# Patient Record
Sex: FEMALE | Race: ASIAN | NOT HISPANIC OR LATINO | URBAN - METROPOLITAN AREA
[De-identification: names, ages, dates, MRNs, and addresses within clinical notes are randomized per-mention and may not be internally consistent; named-entity substitution may affect disease eponyms.]

---

## 2017-01-27 ENCOUNTER — COMMUNICATION - HEALTHEAST (OUTPATIENT)
Dept: FAMILY MEDICINE | Facility: CLINIC | Age: 82
End: 2017-01-27

## 2017-01-27 DIAGNOSIS — R52 PAIN: ICD-10-CM

## 2017-01-30 ENCOUNTER — COMMUNICATION - HEALTHEAST (OUTPATIENT)
Dept: FAMILY MEDICINE | Facility: CLINIC | Age: 82
End: 2017-01-30

## 2017-01-30 DIAGNOSIS — R52 PAIN: ICD-10-CM

## 2017-03-20 ENCOUNTER — OFFICE VISIT - HEALTHEAST (OUTPATIENT)
Dept: FAMILY MEDICINE | Facility: CLINIC | Age: 82
End: 2017-03-20

## 2017-03-20 DIAGNOSIS — J11.1 INFLUENZA: ICD-10-CM

## 2017-03-20 DIAGNOSIS — S36.119A LIVER INJURY, INITIAL ENCOUNTER: ICD-10-CM

## 2017-03-20 DIAGNOSIS — I48.91 ATRIAL FIBRILLATION WITH RAPID VENTRICULAR RESPONSE (H): ICD-10-CM

## 2017-03-20 DIAGNOSIS — F03.90 DEMENTIA WITHOUT BEHAVIORAL DISTURBANCE, UNSPECIFIED DEMENTIA TYPE: ICD-10-CM

## 2017-04-10 ENCOUNTER — COMMUNICATION - HEALTHEAST (OUTPATIENT)
Dept: FAMILY MEDICINE | Facility: CLINIC | Age: 82
End: 2017-04-10

## 2017-04-10 DIAGNOSIS — E61.2 MAGNESIUM DEFICIENCY: ICD-10-CM

## 2017-05-10 ENCOUNTER — COMMUNICATION - HEALTHEAST (OUTPATIENT)
Dept: FAMILY MEDICINE | Facility: CLINIC | Age: 82
End: 2017-05-10

## 2017-05-10 DIAGNOSIS — K21.9 GASTROESOPHAGEAL REFLUX DISEASE WITHOUT ESOPHAGITIS: ICD-10-CM

## 2017-05-13 ENCOUNTER — COMMUNICATION - HEALTHEAST (OUTPATIENT)
Dept: FAMILY MEDICINE | Facility: CLINIC | Age: 82
End: 2017-05-13

## 2017-05-13 DIAGNOSIS — K21.9 GASTROESOPHAGEAL REFLUX DISEASE WITHOUT ESOPHAGITIS: ICD-10-CM

## 2017-05-23 ENCOUNTER — COMMUNICATION - HEALTHEAST (OUTPATIENT)
Dept: FAMILY MEDICINE | Facility: CLINIC | Age: 82
End: 2017-05-23

## 2017-05-23 DIAGNOSIS — F06.30 MOOD DISORDER DUE TO A GENERAL MEDICAL CONDITION: ICD-10-CM

## 2017-08-24 ENCOUNTER — COMMUNICATION - HEALTHEAST (OUTPATIENT)
Dept: FAMILY MEDICINE | Facility: CLINIC | Age: 82
End: 2017-08-24

## 2017-08-24 DIAGNOSIS — F06.30 MOOD DISORDER DUE TO A GENERAL MEDICAL CONDITION: ICD-10-CM

## 2017-08-30 ENCOUNTER — COMMUNICATION - HEALTHEAST (OUTPATIENT)
Dept: FAMILY MEDICINE | Facility: CLINIC | Age: 82
End: 2017-08-30

## 2017-08-30 DIAGNOSIS — R63.0 ANOREXIA: ICD-10-CM

## 2017-09-11 ENCOUNTER — COMMUNICATION - HEALTHEAST (OUTPATIENT)
Dept: FAMILY MEDICINE | Facility: CLINIC | Age: 82
End: 2017-09-11

## 2017-09-14 ENCOUNTER — OFFICE VISIT - HEALTHEAST (OUTPATIENT)
Dept: FAMILY MEDICINE | Facility: CLINIC | Age: 82
End: 2017-09-14

## 2017-09-14 DIAGNOSIS — J18.9 PNEUMONIA: ICD-10-CM

## 2017-09-14 DIAGNOSIS — I48.91 ATRIAL FIBRILLATION WITH RAPID VENTRICULAR RESPONSE (H): ICD-10-CM

## 2017-09-14 DIAGNOSIS — M25.511 ACUTE PAIN OF RIGHT SHOULDER: ICD-10-CM

## 2017-10-04 ENCOUNTER — COMMUNICATION - HEALTHEAST (OUTPATIENT)
Dept: FAMILY MEDICINE | Facility: CLINIC | Age: 82
End: 2017-10-04

## 2017-10-04 DIAGNOSIS — E61.2 MAGNESIUM DEFICIENCY: ICD-10-CM

## 2017-11-03 ENCOUNTER — COMMUNICATION - HEALTHEAST (OUTPATIENT)
Dept: FAMILY MEDICINE | Facility: CLINIC | Age: 82
End: 2017-11-03

## 2017-11-03 DIAGNOSIS — K21.9 GASTROESOPHAGEAL REFLUX DISEASE WITHOUT ESOPHAGITIS: ICD-10-CM

## 2017-11-20 ENCOUNTER — COMMUNICATION - HEALTHEAST (OUTPATIENT)
Dept: FAMILY MEDICINE | Facility: CLINIC | Age: 82
End: 2017-11-20

## 2017-11-20 DIAGNOSIS — F06.30 MOOD DISORDER DUE TO A GENERAL MEDICAL CONDITION: ICD-10-CM

## 2018-02-13 ENCOUNTER — COMMUNICATION - HEALTHEAST (OUTPATIENT)
Dept: FAMILY MEDICINE | Facility: CLINIC | Age: 83
End: 2018-02-13

## 2018-02-13 DIAGNOSIS — F06.30 MOOD DISORDER DUE TO A GENERAL MEDICAL CONDITION: ICD-10-CM

## 2018-05-03 ENCOUNTER — COMMUNICATION - HEALTHEAST (OUTPATIENT)
Dept: FAMILY MEDICINE | Facility: CLINIC | Age: 83
End: 2018-05-03

## 2018-05-03 DIAGNOSIS — K21.9 GASTROESOPHAGEAL REFLUX DISEASE WITHOUT ESOPHAGITIS: ICD-10-CM

## 2018-07-06 ENCOUNTER — COMMUNICATION - HEALTHEAST (OUTPATIENT)
Dept: FAMILY MEDICINE | Facility: CLINIC | Age: 83
End: 2018-07-06

## 2018-08-29 ENCOUNTER — COMMUNICATION - HEALTHEAST (OUTPATIENT)
Dept: FAMILY MEDICINE | Facility: CLINIC | Age: 83
End: 2018-08-29

## 2018-08-29 DIAGNOSIS — F06.30 MOOD DISORDER DUE TO A GENERAL MEDICAL CONDITION: ICD-10-CM

## 2018-09-26 ENCOUNTER — COMMUNICATION - HEALTHEAST (OUTPATIENT)
Dept: FAMILY MEDICINE | Facility: CLINIC | Age: 83
End: 2018-09-26

## 2018-09-26 DIAGNOSIS — F06.30 MOOD DISORDER DUE TO A GENERAL MEDICAL CONDITION: ICD-10-CM

## 2018-09-26 DIAGNOSIS — E61.2 MAGNESIUM DEFICIENCY: ICD-10-CM

## 2018-12-03 ENCOUNTER — COMMUNICATION - HEALTHEAST (OUTPATIENT)
Dept: FAMILY MEDICINE | Facility: CLINIC | Age: 83
End: 2018-12-03

## 2018-12-03 DIAGNOSIS — E61.2 MAGNESIUM DEFICIENCY: ICD-10-CM

## 2018-12-05 ENCOUNTER — COMMUNICATION - HEALTHEAST (OUTPATIENT)
Dept: FAMILY MEDICINE | Facility: CLINIC | Age: 83
End: 2018-12-05

## 2018-12-05 DIAGNOSIS — F06.30 MOOD DISORDER DUE TO A GENERAL MEDICAL CONDITION: ICD-10-CM

## 2019-02-06 ENCOUNTER — OFFICE VISIT - HEALTHEAST (OUTPATIENT)
Dept: FAMILY MEDICINE | Facility: CLINIC | Age: 84
End: 2019-02-06

## 2019-02-06 DIAGNOSIS — N39.41 URGENCY INCONTINENCE: ICD-10-CM

## 2019-02-06 DIAGNOSIS — F06.30 MOOD DISORDER DUE TO A GENERAL MEDICAL CONDITION: ICD-10-CM

## 2019-02-06 DIAGNOSIS — M54.9 UPPER BACK PAIN: ICD-10-CM

## 2019-02-06 DIAGNOSIS — R52 PAIN: ICD-10-CM

## 2019-07-25 ENCOUNTER — RECORDS - HEALTHEAST (OUTPATIENT)
Dept: ADMINISTRATIVE | Facility: OTHER | Age: 84
End: 2019-07-25

## 2019-10-01 ENCOUNTER — COMMUNICATION - HEALTHEAST (OUTPATIENT)
Dept: SCHEDULING | Facility: CLINIC | Age: 84
End: 2019-10-01

## 2021-01-01 ENCOUNTER — COMMUNICATION - HEALTHEAST (OUTPATIENT)
Dept: INTERNAL MEDICINE | Facility: CLINIC | Age: 86
End: 2021-01-01

## 2021-01-01 ENCOUNTER — RECORDS - HEALTHEAST (OUTPATIENT)
Dept: ADMINISTRATIVE | Facility: OTHER | Age: 86
End: 2021-01-01

## 2021-01-01 ENCOUNTER — COMMUNICATION - HEALTHEAST (OUTPATIENT)
Dept: SCHEDULING | Facility: CLINIC | Age: 86
End: 2021-01-01

## 2021-01-01 ENCOUNTER — COMMUNICATION - HEALTHEAST (OUTPATIENT)
Dept: NURSING | Facility: CLINIC | Age: 86
End: 2021-01-01

## 2021-01-01 ENCOUNTER — AMBULATORY - HEALTHEAST (OUTPATIENT)
Dept: CARE COORDINATION | Facility: CLINIC | Age: 86
End: 2021-01-01

## 2021-01-01 ENCOUNTER — COMMUNICATION - HEALTHEAST (OUTPATIENT)
Dept: FAMILY MEDICINE | Facility: CLINIC | Age: 86
End: 2021-01-01

## 2021-01-01 ENCOUNTER — COMMUNICATION - HEALTHEAST (OUTPATIENT)
Dept: CARE COORDINATION | Facility: CLINIC | Age: 86
End: 2021-01-01

## 2021-01-01 ENCOUNTER — AMBULATORY - HEALTHEAST (OUTPATIENT)
Dept: FAMILY MEDICINE | Facility: CLINIC | Age: 86
End: 2021-01-01

## 2021-01-01 ENCOUNTER — TELEPHONE (OUTPATIENT)
Dept: FAMILY MEDICINE | Facility: CLINIC | Age: 86
End: 2021-01-01

## 2021-01-01 ENCOUNTER — OFFICE VISIT - HEALTHEAST (OUTPATIENT)
Dept: FAMILY MEDICINE | Facility: CLINIC | Age: 86
End: 2021-01-01

## 2021-01-01 ENCOUNTER — MEDICAL CORRESPONDENCE (OUTPATIENT)
Dept: HEALTH INFORMATION MANAGEMENT | Facility: CLINIC | Age: 86
End: 2021-01-01

## 2021-01-01 DIAGNOSIS — K59.01 CONSTIPATION BY DELAYED COLONIC TRANSIT: ICD-10-CM

## 2021-01-01 DIAGNOSIS — G89.4 PAIN SYNDROME, CHRONIC: ICD-10-CM

## 2021-01-01 DIAGNOSIS — R63.0 ANOREXIA: ICD-10-CM

## 2021-01-01 DIAGNOSIS — I42.9 CARDIOMYOPATHY, UNSPECIFIED TYPE (H): ICD-10-CM

## 2021-01-01 DIAGNOSIS — Z09 HOSPITAL DISCHARGE FOLLOW-UP: ICD-10-CM

## 2021-01-01 DIAGNOSIS — Z78.9 HEALTH CARE HOME, ACTIVE CARE COORDINATION: ICD-10-CM

## 2021-01-01 DIAGNOSIS — F03.90 DEMENTIA WITHOUT BEHAVIORAL DISTURBANCE, UNSPECIFIED DEMENTIA TYPE: ICD-10-CM

## 2021-01-01 RX ORDER — BISACODYL 10 MG
SUPPOSITORY, RECTAL RECTAL
Qty: 12 SUPPOSITORY | Refills: 0 | Status: SHIPPED | OUTPATIENT
Start: 2021-01-01

## 2021-01-01 ASSESSMENT — PATIENT HEALTH QUESTIONNAIRE - PHQ9
SUM OF ALL RESPONSES TO PHQ QUESTIONS 1-9: 0
SUM OF ALL RESPONSES TO PHQ QUESTIONS 1-9: 0

## 2021-06-01 NOTE — TELEPHONE ENCOUNTER
Who is calling:  MISHA Samuels   Reason for Call: Insurance Rep calling to check apt status for patient as they have requested DME supplies. No apt in the past 30 days or future scheduled.   Date of last appointment with primary care: 02/06/19  Okay to leave a detailed message: Yes

## 2021-06-09 NOTE — PROGRESS NOTES
Initially stomach pain.  Wet all over.  Like water.  Then body ache all over.  Had fever.  Six days ago.  Could not get in and pt refused to go to ED.  Today is better.   Body ache and pain like bone cracking.  Pt agrees is better by...         Stomach is much better.  But still body ache about the same.  Moans at night.   Is constipated.  Urine is normal.    Atrial fibrillation denies palpitations. Denies any bleeding    Anticoagulated denies bleed    Hx liver injury denies icterus  ROS: as noted above    OBJECTIVE:   Vitals:    03/20/17 1611   BP: 120/76   Pulse: 68   Resp: 20   Temp: 97.9  F (36.6  C)   SpO2: 96%      Eyes: non icteric, noninflamed  Lungs: no resp distress  Heart: irregular  Ankles: no edema  Muscles: nontender  Mental status: tired, verbal, no distress  Skin warm and dry  Neuro: nonfocal    ASSESSMENT/PLAN:  Likely resolving influenza.  Too late for antiviral/ sx tx.   Fluids.  Anticipate return to baseline and follow up for chronic issues otherwise follow up   1. Influenza  acetaminophen-codeine (TYLENOL #2) 300-15 mg per tablet   2. Atrial fibrillation with rapid ventricular response     3. Liver injury, initial encounter     4. Dementia without behavioral disturbance, unspecified dementia type

## 2021-06-13 NOTE — PROGRESS NOTES
ED shoulder pain.  rx analgesic and better today.  Last narcotic yesterday.    cxr finding of ? Left lower pneumonia.  Sent home on antibiotic.  Had cough at the time.  Now without cough or fever or sob.    Atrial fibrillation denies palpitations. Denies any bleeding    On anticoagulant not needing inr.  On bblocker for hx rapid vent response    States walks with cane or walker about fifteen yards.  Needs help with bathing.  Can go slowly up stairs with rest and some weight bearing assist.    Missing teeth and having difficulty chewing   ROS: as noted above    OBJECTIVE:   Vitals:    09/14/17 1054   BP: 106/66   Pulse: 74   Resp: 24   Temp: 98.1  F (36.7  C)   SpO2: 96%      Eyes: non icteric, noninflamed  Lungs: no resp distress  Heart: regular  Ankles: no edema  Muscles: nontender  Mental status: euthymic.   Calm  Neuro: nonfocal  Wheelchair  Symmetric use of arms and hands.  No drift  Mild periscapular tenderness  Wt is stable from a year ago    ASSESSMENT/PLAN:  Improving sx though ? Diagnosis.  Could be infectious with related myalgia.  In any case is better/ finish antibiotic, eat, mobilize and follow up in two wks for film and general labs requested by family    1. Pneumonia     2. Acute pain of right shoulder     3. Atrial fibrillation with rapid ventricular response       Chronic issues stable/ same treatment.

## 2021-06-15 NOTE — TELEPHONE ENCOUNTER
New Appointment Needed  What is the reason for the visit:    Inpatient/ED Follow Up Appt Request  At what hospital or facility were you seen?: [facility]  What is the reason you were seen?: [place]  What date were you admitted?: date: 03-02-21  What date were you discharged?: date: 03-04-21  What was the recommended timeframe for your follow up appointment?: As soon as possible  Provider Preference: Any available  How soon do you need to be seen?: next week  Waitlist offered?: No  Okay to leave a detailed message:  Yes, confidential and secured phone.  [phone]

## 2021-06-15 NOTE — TELEPHONE ENCOUNTER
It apparently went to our internal hospice - I tried to re-write it today.  (I didn't realize it had to go to [facility]).

## 2021-06-15 NOTE — TELEPHONE ENCOUNTER
Valentine from [facility] calling regarding referral request made 03/08.    Reviewed chart and relayed that PCP sent referral in on the evening of 3/8.     Inquiring where the referral has been sent, it has not been received at [facility].    Requesting referral is faxed to  Attn.: Care Navigation    Needs today if at all feasible.

## 2021-06-15 NOTE — PROGRESS NOTES
3/8/2021  Patient enrolled in [facility] as of 3-8-21  Reviewed assessment, goals, and any delegations from [facility] RN         CHW Follow up: Monthly    CHW Plan: Follow up on goals    CHW Next Follow Up: 4-7-21

## 2021-06-15 NOTE — PROGRESS NOTES
Ria Farrar is a 89 y.o. female who is being evaluated via a billable video visit.      How would you like to obtain your AVS? Mail a copy.  If dropped from the video visit, the video invitation should be resent by: Text to cell phone: [phone]  Will anyone else be joining your video visit? Yes: daughter in law. How would they like to receive their invitation? Text to cell phone: [phone]      Video Start Time: 10:23 AM    ASSESSMENT/PLAN:  1. Pain syndrome, chronic  DISCONTINUED: HYDROmorphone (DILAUDID) 4 mg/4 mL Liqd solution   2. Constipation by delayed colonic transit  DISCONTINUED: bisacodyL (DULCOLAX) 10 mg suppository   3. Hospital discharge follow-up         This is an 89 yo female - recently hospitalized at [facility] for overall weakness, rapid health decline.    I have reviewed available hospital records, consults, notes, discharge summary as well as imaging and lab results.  In addition I have reviewed her medication list and made any adjustments as indicated in orders.      Patient has been discharged home and felt to be terminal .  Discussions regarding hospice were held in hospital - family are there 24 hours/day right now - she is occasionally awake and responsive, but taking in very little.  Was not discharged on any medications.  Family will be meeting with demi this weekend to discuss situation and review willingness to pursue hospice therapy.  For now, there is home care coming in - a nurse was available to me during this visit to navigate needs.  She suggests that patient is moaning and seems uncomfortable.  Because she is taking in very little, we will try concentrated pain medication:  Hydromorphone.  Also , there is concern for constipation - multifactorial - will add Bisacodyl suppositories prn.    I have answered questions from family, home care nurse.  Counseled family regarding expectations.  They will let us know about hospice as soon as possible.   Return in about 2 weeks  (around 3/19/2021) for Recheck.      Medications Discontinued During This Encounter   Medication Reason     bisacodyl (DULCOLAX) 10 mg suppository Reorder     traMADol (ULTRAM) 50 mg tablet Therapy completed     rivaroxaban 15 mg Tab Therapy completed     ondansetron (ZOFRAN) 8 MG tablet Therapy completed     omeprazole (PRILOSEC) 20 MG capsule Therapy completed     NUTRITIONAL DRINK Liqd Therapy completed     metoprolol succinate (TOPROL-XL) 25 MG Therapy completed     magnesium oxide (MAG-OX) 400 mg (241.3 mg magnesium) tablet Therapy completed     magnesium hydroxide (MILK OF MAG) 400 mg/5 mL Susp suspension Therapy completed     dextromethorphan-guaiFENesin (ROBITUSSIN-DM)  mg/5 mL liquid      citalopram (CELEXA) 10 MG tablet Therapy completed     acetaminophen 500 mg coapsule Therapy completed     There are no Patient Instructions on file for this visit.    Chief Complaint:  Chief Complaint   Patient presents with     Hospital Visit Follow Up     no urine output, not opening eyes or real responses, hospice converstaion       HPI:   Ria Farrar is a 89 y.o. 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  Social History     Socioeconomic History     Marital status:      Spouse name: Not on file     Number of children: Not on file     Years of education: Not on file     Highest education level: Not on file   Occupational History     Not on file   Social Needs     Financial resource strain: Not on file     Food insecurity     Worry: Not on file     Inability: Not on file     Transportation needs     Medical: Not on file     Non-medical: Not on file   Tobacco Use     Smoking status: Never Smoker     Smokeless tobacco: Never Used   Substance and Sexual Activity     Alcohol use: No     Drug use: No     Sexual activity: Never   Lifestyle     Physical activity     Days per week: Not on file     Minutes per session: Not on file     Stress: Not on file   Relationships     Social connections     Talks on phone: Not on file     Gets together: Not on file     Attends Baptism service: Not on file     Active member of club or organization: Not on file     Attends meetings of clubs or organizations: Not on file     Relationship status: Not on file     Intimate partner violence     Fear of current or ex partner: Not on file     Emotionally abused: Not on file     Physically abused: Not on file     Forced sexual activity: Not on file   Other Topics Concern     Not on file   Social History Narrative     Not on file     No family history on file.    Meds:    Current Outpatient Medications:      bisacodyL (DULCOLAX) 10 mg suppository, Insert suppository rectally q 3 days as needed for treatment of constipation., Disp: 12 suppository, Rfl: 0     HYDROmorphone (DILAUDID) 4 mg/4 mL Liqd solution, Take 0.3 mL (0.3 mg total) by mouth every 12 (twelve) hours as needed (pain)., Disp: 10 mL, Rfl: 0     miscellaneous medical supply Misc, Hospital bed with mattress and 1/2 rails. Semi-electric bed. Length of need 99 months. Bed extender: no,  Disp: , Rfl:     Allergies:  No Known Allergies    ROS:  Pertinent positives as noted in HPI; otherwise 12 point ROS negative.      Physical Exam:  EXAM:  There were no vitals taken for this visit.   Gen:  NAD, lying in bed, appears comfortable currently, caregiver by her side  Extr:  Neg.  Neuro:  No asymmetry          Results:  Reviewed hospital results          Video-Visit Details    Type of service:  Video Visit    Video End Time (time video stopped): 10:47 AM  Originating Location (pt. Location): Home    Distant Location (provider location):  [facility]     Platform used for Video Visit: [employer]

## 2021-06-15 NOTE — PROGRESS NOTES
3/4/2021  CCC referral from [facility] PCP, services and support        Clinic Care Coordination Contact  Community Health Worker Initial Outreach    CHW Initial Information Gathering:  Referral Source: Other, specify(Care Coordinator at [facility])  Preferred Hospital: [facility]  [phone]  Preferred Urgent Care: [facility], [phone]  Current living arrangement:: I live in a private home with family  Type of residence:: Private home - stairs  Community Resources: PCA(someone will call to set up home care, PCA. daughter in law)  Supplies Currently Used at Home: (family don't know how to call to get more brief)  Equipment Currently Used at Home: walker, rolling, cane, straight, hospital bed, shower chair  Informal Support system:: Family, Children(daughter in law and son and PCA)  No PCP office visit in Past Year: Yes(has appt tomorrow 3-5-21 to establish care with Dr Gould)  Transportation means:: Family, Other(PCA daughter in law)  CHW Additional Questions  If ED/Hospital discharge, follow-up appointment scheduled as recommended?: Yes  Patient agreeable to assistance with scheduling?: Yes  CHW assisted patient to schedule (specify): appt with Dr River tomorrow 3-5-21  MyChart active?: No  Patient agreeable to assistance with activating MyChart?: No    Patient accepts CC: Yes. Patient scheduled for assessment with CCC RN  on 3-8-21  at 10:30am. Patient noted desire to discuss medical equipement wheelchair, briefs, services and support in home..     [facility]  : Dejan Farrar  Spoke to daughter in law Mary.  Consent on file to communicate.  Daughter in law reported.  has PCA services. PCA is daughter in law  Will be getting hospital bed soon  Some concerns that she still weak and might not able to make It to the appt tomorrow and wondering if they can change the appt to video appt    She is requesting help to get wheel  chair and briefs.  Suggested to talk to the doctor tomorrow for the order.    Coordinated with  and got appt switched to Video appt for tomorrow at 9:40am.    CHW Follow up: 3-8-21 review assessment, goals and consult with RN if needed.

## 2021-06-15 NOTE — TELEPHONE ENCOUNTER
3/5/2021  Received call from patient's daughter in law.  Stated she went to [facility] to  her medications but they said they don't have the medications and to go to [facility].    Daughter in law  requesting help to transfer or send all prescriptions to [facility] on [address]   She is not sure what all her medications are and patient had phone visit with Dr Gould today.    Please support daughter in law

## 2021-06-15 NOTE — TELEPHONE ENCOUNTER
Reason for Call: Request for an order or referral:    Order or referral being requested: a informational hospice visit referral    Date needed: as soon as possible    Has the patient been seen by the PCP for this problem? YES    Additional comments: no  Phone number Patient can be reached at:  Other phone number:  [phone]    Best Time:  As soon as possible    Can we leave a detailed message on this number?  Yes    Call taken on 3/8/2021 at 1:31 PM by Birgit Vides

## 2021-06-15 NOTE — PROGRESS NOTES
"3/3/2021  Received email from:   Bernice Chinchilla RN   Care Coordinator  [facility]   Phone:[phone]  Fax: [phone]    \"Donovan Belcher,  We do care coordination for the patient Ria Farrar 1/1/1932 and wanted to see if she is enrolled in the CC program at the clinic?  Ria is currently in the hospital at [facility] for weakness and not eating (failure to thrive?) and we are working with the  on her dc plan.  Dr. Cuadra was Ria s PCP and since he retired, she has not established a new PCP. She will be needing home health care as well as a wheelchair. The SW was going to call to set up the appointment with the clinic so she can establish care and get the needed DME and help.   Family told the SW that someone was working on a wc but no one ever did anything about it. They told her that the clinic never calls them back and they do not get the help they need. It seems the family is a bit confused because they told the SW she had no help, which is not accurate. Ria has over 10 hours of PCA a day. I looked through our notes and did not see anything written by the previous CC that a that we work working with the clinic on getting a wheelchair.   The SW wanted to set up an appointment with an [facility] but I told her that her history is at [facility] and because we only do CC for [facility] patients Ria would transfer out to a new delegate and her needs could further be delayed.   Any information or help would be greatly appreciated.  Thanks,   Bernice Chinchilla RN   Care Coordinator  [facility]   Phone:[phone]  Fax: [phone]\"       "

## 2021-06-15 NOTE — TELEPHONE ENCOUNTER
Spoke with Marina from [place] scheduled patient for Friday a.m. if patient doesn't get discharged on Thur 3/4/20 they will call back to reschedule appointment  Linda Stoen CMA

## 2021-06-15 NOTE — PROGRESS NOTES
3/5/2021   Received call from patient's daughter in law.  Stated she went to [facility] to  her medications but they said they don't have the medications and to go to [facility].    Daughter in law  requesting help to transfer or send all prescriptions to [facility] on [address]   She is not sure what all her medications are and patient had phone visit with Dr Gould today.    CHW sent telephone message to PCP Care Team [place] for support    CHW Follow up: 3-8-21 review assessment, goals and consult with CC RN if needed.

## 2021-06-16 NOTE — PROGRESS NOTES
Clinic Care Coordination Contact    Follow Up Progress Note      Assessment: CCC SW contacted Mary by phone with an . She reported she received the FMLA forms from  yesterday. Need a provider to fill out. BETTY suggested she check with the hospice nurse first to see if they are able to complete since they are coming to the home. If not, SW will help coordinate. BETTY will outreach on 4/12     Goals addressed this encounter:   Goals Addressed                 This Visit's Progress      Other (pt-stated)        Goal Statement: My care giver ( DIL) would like to be able to take leave from employer to assist with my care within 2 months  Date Goal set: 03/08/21    Barriers: DIL unclear of process  Strengths: family supports  Date to Achieve By: May  Patient expressed understanding of goal: yes  Action steps to achieve this goal:  1. Daughter in law Hernandez will discuss with hospice nurse to see if they can help sign off on the FMLA forms. SW will outreach on 4/12 to check in  2. Davina in law Hernandez will update CCC team when next step and if need additional resources and support and consider visit with BETTY CC to review if needed     Updated: 4/8/2021             Intervention/Education provided during outreach: See above     Outreach Frequency: monthly    Plan:   SW will outreach on 4/12

## 2021-06-16 NOTE — PROGRESS NOTES
4/7/2021   Clinic Care Coordination Contact    Community Health Worker Follow Up    Goals:   Goals Addressed                 This Visit's Progress       Patient Stated      Other (pt-stated)   30%     Goal Statement: My care giver ( DIL) would like to be able to take leave from employer to assist with my care within 2 months  Date Goal set: 03/08/21    Barriers: DIL unclear of process  Strengths: family supports  Date to Achieve By: May  Patient expressed understanding of goal: yes  Action steps to achieve this goal:  1. Daughter in law Hernandez will discuss with [facility] BETTY tomorrow 4-8-21 at 1pm for support regarding FMLS form from employer and what to do with the form.  2. Daughter in law Hernandez will update CCC team when next step and if need additional resources and support and consider visit with BETTY CC to review if needed     Updated: 4-7-21 [facility] [email or web address] : Jaswinder Fried  Called and spoke to patient's daughter in law Hernandez through [email or web address]  and follow up on goal.  Patient's daughter reported:  -mother is at home from the hospital.  -her  went and got the form from work HR and not how to fill out the form.  -daughter in law's  has the form and needs help with the form.    Scheduled appt with [facility] BETTY for support 4-8-21 at 1pm regarding FMLA form.    Routed to CCC BETTY and CCC RN for support with FMLA form for daughter in law due to in hospice care.      CHW Follow up: Monthly    CHW Plan: Follow up on goals    CHW Next Follow Up: 5-11-21

## 2021-06-16 NOTE — TELEPHONE ENCOUNTER
I have signed everything on my desk.  I believe there was a form for this patient earlier this week.

## 2021-06-16 NOTE — PROGRESS NOTES
Clinic Care Coordination Contact    Follow Up Progress Note      Assessment: [facility] SW contacted Mary with an . She was able to get help from the homecare team for the FMLA paperwork, waiting for it to be sent int.    Goals addressed this encounter:   Goals Addressed                 This Visit's Progress      Other (pt-stated)        Goal Statement: My care giver ( DIL) would like to be able to take leave from employer to assist with my care within 2 months  Date Goal set: 03/08/21    Barriers: DIL unclear of process  Strengths: family supports  Date to Achieve By: May  Patient expressed understanding of goal: yes  Action steps to achieve this goal:  1. My daughter in law was able to get help from the homecare team to complete FMLA paperwork and will update CCC at next outreach.        Updated: 4/12/2021             Intervention/Education provided during outreach: See above     Outreach Frequency: monthly    Plan:   Standard Outreach

## 2021-06-16 NOTE — TELEPHONE ENCOUNTER
I have signed everything on my desk - if they don't have it, I don't have it.  Can they send it again?

## 2021-06-16 NOTE — TELEPHONE ENCOUNTER
Has this order been faxed? [facility] is calling back to check on the status of this request. Please fax to Miles [phone]

## 2021-06-16 NOTE — TELEPHONE ENCOUNTER
Reason for Call:  Plan of Care     Detailed comments: Bonny called from [facility] requesting for provider to sign plan of care for this patient. It was faxed 04/06/2021.    Phone Number Patient can be reached at: Other phone number:  [phone]    Best Time: any    Can we leave a detailed message on this number?: Yes    Call taken on 4/8/2021 at 2:15 PM by Namita Loving

## 2021-06-17 NOTE — PROGRESS NOTES
5/11/2021   Clinic Care Coordination Contact  [facility] Josh Steinberg       Clinical Data: Care Coordinator Outreach  Outreach attempted x 1.   left message on patient's voicemail with call back information and requested return call.  Plan: Care Coordinator  will try to reach patient again in 10 business days.    CHW Follow up: 5-25-21

## 2021-06-17 NOTE — PROGRESS NOTES
5/11/2021   Clinic Care Coordination Contact  [facility] Josh Steinberg       Clinical Data: Care Coordinator Outreach  Outreach attempted x 1.   left message on patient's  Daughter in law's voicemail with call back information and requested return call.  Plan: Care Coordinator  will try to reach patient again in 10 business days.    CHW Follow up: 5-25-21

## 2021-06-17 NOTE — PROGRESS NOTES
Clinic Care Coordination Contact    Situation: Patient chart reviewed by care coordinator.    Background: RN CC review for status update     Assessment: BETTY CC working with family to support goal     Plan/Recommendations: CCC team will support family with goal progression and resource supports

## 2021-06-23 NOTE — PROGRESS NOTES
Daughter in law states pt moan in pain.  Poor sleeping.    Out of med for a while.  For body ache.  The body ache is back.  And worse from bad weather.  The pain is the same as prior.  Mostly in upper back and shoulder.    Breathe cough with some mucus.  Can walk room to room slowly holding on wall.  This different as now unable to ascend stairs.  Last year able to.  Slowly worsened.    Poor appetite.  Gradual change    The med missing is acetaminophen   Was taking one hs and also two times a day prn    Other than the acetaminophen has 4 other meds.   Cannot name    Urge incontinence.  Diapers 2 per day.   Is on same level as the toilet.  Frequently does not make it.     mood disorder on med.  No suicidal ideation     Is home with son and daughter in law all the time  OBJECTIVE:   Vitals:    02/06/19 1344   BP: 130/70   Pulse: 72   Resp: 26    fatigued  Eyes: non icteric, noninflamed  Lungs: no resp distress.   clear  Heart: regular  Ankles: no edema  Muscles: nontender  Mental status: euthymic  Neuro: nonfocal    There is no height or weight on file to calculate BMI.     Wheelchair    ASSESSMENT/PLAN:    Chronic nonspecific pain.   Worse with running out of acetaminophen     1. Upper back pain  acetaminophen 500 mg coapsule   2. Pain  acetaminophen 500 mg coapsule   3. Mood disorder due to a general medical condition     4. Urgency incontinence       Chronic issues stable/ same treatment.   follow up if sxs do not return to baseline    Family will figure out where the diapers come from and have them send forms to sign.   2/d diapers used.    Wonders about ensure.  Discussed likely lack of coverage

## 2021-06-25 NOTE — PROGRESS NOTES
5/27/2021  Clinic Care Coordination Contact  Care Team Conversations     Consulted with CCC Team  Plan: CCC RN will reach out to daughter in law to support and determine whether to transition to maintenance or graduated from [facility].      CHW Follow up: Monthly    CHW Plan: Follow up on goals    CHW Next Follow Up: 7-7-21

## 2021-06-25 NOTE — PROGRESS NOTES
Clinic Care Coordination Contact    Follow Up Progress Note      Assessment: RN CC outreach with support of interpretive services,  spoke with patient DIL for status update     Family has established care with hospice support   RN CC discussed disenrollment from CCC and to reach out to care team directly if need additional support and CCC will be available if needed in future    Patient family verbalized understanding via interpretive services. Engaged in [employer] communication during visit        Plan:  Graduate - as goal for hospice complete -   No further outreach at this time  Will be available in future if appropriate

## 2021-06-25 NOTE — PROGRESS NOTES
5/26/2021   Clinic Care Coordination Contact    Community Health Worker Follow Up    Goals:   Goals Addressed                 This Visit's Progress       Patient Stated      COMPLETED: Other (pt-stated)   100%     Goal Statement: My care giver ( DIL) approved for FMLA from employer to assist with my care,  Date Goal set: 03/08/21    Barriers: DIL unclear of process  Strengths: family supports  Date to Achieve By: completed  Patient expressed understanding of goal: yes  Personal Plan:   .My daughter in law will continue to report and ask for support from Hospice care team as needed to address health concerns or needs.               [employer] [employer] [facility] [place]  Daughter in law reported:   -she return to work to on 5-25-21   -mother is scare at night not able to sleep, not herself not sure she is awake or sleep.    She fell off her bed this May not sure when.     Encouraged her to report and share with hospice care team and ask for support if needed to address any concerns.    CHW will follow up next month to make sure she address her concerns with hospice care team.    CHW consult with CCC RN regarding relaying message to hospice care team    CHW Follow up: Monthly    CHW Plan: Follow up on goals    CHW Next Follow Up: 7-2-21

## 2021-06-30 NOTE — PROGRESS NOTES
Progress Notes by Sudha Carl RN at 3/8/2021 10:30 AM     Author: Sudha Carl RN Service: -- Author Type: Registered Nurse    Filed: 3/8/2021  5:49 PM Encounter Date: 3/8/2021 Status: Signed    : Sudha Carl RN (Registered Nurse)       Clinic Care Coordination Contact    Clinic Care Coordination Contact  OUTREACH    Referral Information:  Referral Source: Other, specify(Care Coordinator at [facility])      RN CC outreach and spoke with family - Daughter in Law with support of interpretive services     Incomplete assessment - pt was seen by PCP recently and notes were incomplete at time of outreach. Will review notes at next outreach and support recommendations and set goals    Chief Complaint   Patient presents with   ? Clinic Care Coordination - Initial        Universal Utilization:   Clinic Utilization  Difficulty keeping appointments:: No  Compliance Concerns: No  No-Show Concerns: No  No PCP office visit in Past Year: Yes(has appt tomorrow 3-5-21 to establish care with Dr Gould)  Utilization    Last refreshed: 3/8/2021  1:51 PM: Hospital Admissions 0           Last refreshed: 3/8/2021  1:51 PM: ED Visits 0           Last refreshed: 3/8/2021  1:51 PM: No Show Count (past year) 0              Current as of: 3/8/2021  1:51 PM              Clinical Concerns:  Current Medical Concerns:    Patient Active Problem List   Diagnosis   ? Dementia   ? Dry Eye Syndrome   ? Acquired Absence Of Teeth   ? Ataxic Gait   ? Decrease In Appetite   ? 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 Thyroid lump   ? Gastroenteritis   ? Atrial fibrillation with rapid ventricular response (H)   ? Cardiomyopathy (H)   ? Abdominal pain   ? Liver injury, initial encounter   ? Tongue mass   ? Gastroesophageal reflux disease without esophagitis   ? Mood disorder due to a general medical condition           Health Maintenance Reviewed: Due/Overdue - PCP visit notes  not signed yet as of call- will follow up at next outreach to support addressing care gaps and complete assessment  Medication Management:   DIL states that they were able to get her mothers medications filled      Living Situation:  Current living arrangement:: I live in a private home with family  Type of residence:: Private home - stairs    Lifestyle & Psychosocial Needs:           Transportation means:: Family, Other(PCA daughter in law)     Restorationism or spiritual beliefs that impact treatment:: No  Mental health DX:: No  Mental health management concern (GOAL):: No  Informal Support system:: Family, Children(daughter in law and son and PCA)   Socioeconomic History   ? Marital status:      Spouse name: Not on file   ? Number of children: Not on file   ? Years of education: Not on file   ? Highest education level: Not on file     Tobacco Use   ? Smoking status: Never Smoker   ? Smokeless tobacco: Never Used   Substance and Sexual Activity   ? Alcohol use: No   ? Drug use: No   ? Sexual activity: Never         Resources and Interventions:  Current Resources:      Community Resources: PCA(someone will call to set up home care, PCA. daughter in law)  Supplies Currently Used at Home: (family don't know how to call to get more brief)  Equipment Currently Used at Home: walker, rolling, cane, straight, hospital bed, shower chair                     Goals:   Goals        General    Other (pt-stated)     Notes - Note created  3/8/2021 11:07 AM by Sudha Carl RN    Goal Statement: My care giver ( DIL) would like to be able to take leave from employer to assist with my care within 2 months  Date Goal set: 03/08/21    Barriers: DIL unclear of process  Strengths: family supports  Date to Achieve By: May  Patient expressed understanding of goal: yes  Action steps to achieve this goal:  1. DIL will discuss with employer and bring necessary forms to PCP office to complete  2. I will update CCC team when next step  and if need additional resources and support and consider visit with [facility] CC to review if needed                 Patient/Caregiver understanding: Patient DIL verbalized understanding via interpretive services. Engaged in [employer] communication during visit             Plan: DIL will drop off paperwork with clinic from employer for PCP to review and consider    RN CC will outreach in 4-6 weeks to support ongoing recommendations and plan of care will be available sooner if needed.

## 2021-08-03 NOTE — TELEPHONE ENCOUNTER
Reason for Call:  Other Hospice Update    Detailed comments: Wild from [facility] called to update provider. Patient is transitioning into end of life, family is doing wonderful job of caring and keeping patient comfortable. Hydromophone was increased to 0.1 mg every 4 hours and 0.1 mg every hour PRN for pain and SOB. If there are any questions, please csll Wild at [phone].    Phone Number Patient can be reached at: Home number on file [phone] (home)    Best Time: any    Can we leave a detailed message on this number? YES    Call taken on 8/3/2021 at 4:07 PM by Namita Loving